# Patient Record
Sex: MALE | Race: WHITE | NOT HISPANIC OR LATINO | Employment: FULL TIME | URBAN - METROPOLITAN AREA
[De-identification: names, ages, dates, MRNs, and addresses within clinical notes are randomized per-mention and may not be internally consistent; named-entity substitution may affect disease eponyms.]

---

## 2017-11-18 ENCOUNTER — HOSPITAL ENCOUNTER (EMERGENCY)
Facility: HOSPITAL | Age: 47
Discharge: HOME | End: 2017-11-18
Attending: EMERGENCY MEDICINE
Payer: MEDICAID

## 2017-11-18 VITALS
TEMPERATURE: 98 F | HEART RATE: 82 BPM | OXYGEN SATURATION: 98 % | SYSTOLIC BLOOD PRESSURE: 138 MMHG | WEIGHT: 225 LBS | BODY MASS INDEX: 30.48 KG/M2 | DIASTOLIC BLOOD PRESSURE: 82 MMHG | HEIGHT: 72 IN | RESPIRATION RATE: 18 BRPM

## 2017-11-18 DIAGNOSIS — R11.2 NON-INTRACTABLE VOMITING WITH NAUSEA, UNSPECIFIED VOMITING TYPE: Primary | ICD-10-CM

## 2017-11-18 DIAGNOSIS — E86.0 DEHYDRATION: ICD-10-CM

## 2017-11-18 DIAGNOSIS — K29.00 ACUTE GASTRITIS WITHOUT HEMORRHAGE, UNSPECIFIED GASTRITIS TYPE: ICD-10-CM

## 2017-11-18 LAB
ALBUMIN SERPL BCP-MCNC: 4.1 G/DL
ALP SERPL-CCNC: 55 U/L
ALT SERPL W/O P-5'-P-CCNC: 35 U/L
ANION GAP SERPL CALC-SCNC: 14 MMOL/L
AST SERPL-CCNC: 25 U/L
BACTERIA #/AREA URNS HPF: NORMAL /HPF
BASOPHILS # BLD AUTO: 0.05 K/UL
BASOPHILS NFR BLD: 0.3 %
BILIRUB SERPL-MCNC: 0.6 MG/DL
BILIRUB UR QL STRIP: NEGATIVE
BUN SERPL-MCNC: 13 MG/DL
CALCIUM SERPL-MCNC: 9.4 MG/DL
CHLORIDE SERPL-SCNC: 102 MMOL/L
CLARITY UR: CLEAR
CO2 SERPL-SCNC: 24 MMOL/L
COLOR UR: YELLOW
CREAT SERPL-MCNC: 1.3 MG/DL
DIFFERENTIAL METHOD: ABNORMAL
EOSINOPHIL # BLD AUTO: 0.1 K/UL
EOSINOPHIL NFR BLD: 0.4 %
ERYTHROCYTE [DISTWIDTH] IN BLOOD BY AUTOMATED COUNT: 15.4 %
EST. GFR  (AFRICAN AMERICAN): >60 ML/MIN/1.73 M^2
EST. GFR  (NON AFRICAN AMERICAN): >60 ML/MIN/1.73 M^2
GLUCOSE SERPL-MCNC: 108 MG/DL
GLUCOSE UR QL STRIP: ABNORMAL
HCT VFR BLD AUTO: 50.5 %
HGB BLD-MCNC: 16.6 G/DL
HGB UR QL STRIP: NEGATIVE
HYALINE CASTS #/AREA URNS LPF: 0 /LPF
KETONES UR QL STRIP: ABNORMAL
LEUKOCYTE ESTERASE UR QL STRIP: NEGATIVE
LIPASE SERPL-CCNC: 54 U/L
LYMPHOCYTES # BLD AUTO: 1.6 K/UL
LYMPHOCYTES NFR BLD: 10.9 %
MCH RBC QN AUTO: 28.4 PG
MCHC RBC AUTO-ENTMCNC: 32.9 G/DL
MCV RBC AUTO: 86 FL
MICROSCOPIC COMMENT: NORMAL
MONOCYTES # BLD AUTO: 0.7 K/UL
MONOCYTES NFR BLD: 4.9 %
NEUTROPHILS # BLD AUTO: 12.5 K/UL
NEUTROPHILS NFR BLD: 83.5 %
NITRITE UR QL STRIP: NEGATIVE
PH UR STRIP: 5 [PH] (ref 5–8)
PLATELET # BLD AUTO: 204 K/UL
PMV BLD AUTO: 11.5 FL
POTASSIUM SERPL-SCNC: 4.3 MMOL/L
PROT SERPL-MCNC: 8 G/DL
PROT UR QL STRIP: ABNORMAL
RBC # BLD AUTO: 5.85 M/UL
RBC #/AREA URNS HPF: 2 /HPF (ref 0–4)
SODIUM SERPL-SCNC: 140 MMOL/L
SP GR UR STRIP: 1.02 (ref 1–1.03)
SQUAMOUS #/AREA URNS HPF: 4 /HPF
URN SPEC COLLECT METH UR: ABNORMAL
UROBILINOGEN UR STRIP-ACNC: ABNORMAL EU/DL
WBC # BLD AUTO: 14.95 K/UL
WBC #/AREA URNS HPF: 0 /HPF (ref 0–5)

## 2017-11-18 PROCEDURE — 85025 COMPLETE CBC W/AUTO DIFF WBC: CPT

## 2017-11-18 PROCEDURE — 80053 COMPREHEN METABOLIC PANEL: CPT

## 2017-11-18 PROCEDURE — 99283 EMERGENCY DEPT VISIT LOW MDM: CPT | Mod: 25

## 2017-11-18 PROCEDURE — 96360 HYDRATION IV INFUSION INIT: CPT

## 2017-11-18 PROCEDURE — 81000 URINALYSIS NONAUTO W/SCOPE: CPT

## 2017-11-18 PROCEDURE — 25000003 PHARM REV CODE 250: Performed by: PHYSICIAN ASSISTANT

## 2017-11-18 PROCEDURE — 83690 ASSAY OF LIPASE: CPT

## 2017-11-18 RX ORDER — ONDANSETRON 4 MG/1
4 TABLET, ORALLY DISINTEGRATING ORAL
Status: COMPLETED | OUTPATIENT
Start: 2017-11-18 | End: 2017-11-18

## 2017-11-18 RX ORDER — LISINOPRIL 40 MG/1
40 TABLET ORAL DAILY
COMMUNITY

## 2017-11-18 RX ORDER — AMLODIPINE BESYLATE AND ATORVASTATIN CALCIUM 5; 10 MG/1; MG/1
1 TABLET, FILM COATED ORAL DAILY
COMMUNITY

## 2017-11-18 RX ORDER — SUCRALFATE 1 G/1
1 TABLET ORAL 2 TIMES DAILY
Qty: 60 TABLET | Refills: 0 | Status: SHIPPED | OUTPATIENT
Start: 2017-11-18 | End: 2017-12-18

## 2017-11-18 RX ORDER — PANTOPRAZOLE SODIUM 20 MG/1
20 TABLET, DELAYED RELEASE ORAL DAILY
Qty: 30 TABLET | Refills: 0 | Status: SHIPPED | OUTPATIENT
Start: 2017-11-18 | End: 2018-11-18

## 2017-11-18 RX ADMIN — SODIUM CHLORIDE 1000 ML: 0.9 INJECTION, SOLUTION INTRAVENOUS at 02:11

## 2017-11-18 RX ADMIN — ONDANSETRON 4 MG: 4 TABLET, ORALLY DISINTEGRATING ORAL at 12:11

## 2017-11-18 RX ADMIN — LIDOCAINE HYDROCHLORIDE: 20 SOLUTION ORAL; TOPICAL at 12:11

## 2017-11-18 NOTE — ED TRIAGE NOTES
Pt arrived to ED c/o N/V, fever, hot flash, shaking for last 24 hours, 4th and 5th lumbar back pain. Pt reports took back pain meds last week.

## 2017-11-18 NOTE — ED PROVIDER NOTES
Encounter Date: 11/18/2017    SCRIBE #1 NOTE: Jamey CANDELARIO, rosa scribing for, and in the presence of,  Gautam Mitchell PA-C. I have scribed the following portions of the note - Other sections scribed: HPI, ROS.       History     Chief Complaint   Patient presents with    Back Pain     x4 days lower back, emesis x4 hours     CC: Vomiting    46 year old male with chronic back pain presents to the ED for evaluation of a 1 day history of multiple episodes of dark emesis with associated generalized abdominal pain. He also reports a 3-4 day history of decreased appetite leading up to the episodes of emesis today. Pt reports that he ran out of his pain medications 10 days ago. He has a pain management appointment in 3-4 weeks. He denies dysuria, hematuria, and other symptoms. Pt does not drink or smoke. He attempted Ibuprofen and Gabapentin for pain with no relief. No other symptoms reported. No alleviating or exacerbating factors reported.       The history is provided by the patient. No  was used.     Review of patient's allergies indicates:   Allergen Reactions    Penicillins Anaphylaxis     History reviewed. No pertinent past medical history.  Past Surgical History:   Procedure Laterality Date    HAND SURGERY       History reviewed. No pertinent family history.  Social History   Substance Use Topics    Smoking status: Never Smoker    Smokeless tobacco: Never Used    Alcohol use No     Review of Systems   Constitutional: Negative for appetite change (decreased) and fever.   HENT: Negative for sore throat.    Respiratory: Negative for shortness of breath.    Cardiovascular: Negative for chest pain.   Gastrointestinal: Positive for abdominal pain (generalized) and vomiting. Negative for nausea.   Genitourinary: Negative for dysuria and hematuria.   Musculoskeletal: Negative for back pain.   Skin: Negative for rash.   Neurological: Negative for weakness.   Hematological: Does not  bruise/bleed easily.       Physical Exam     Initial Vitals [11/18/17 1018]   BP Pulse Resp Temp SpO2   (!) 152/74 105 19 98.5 °F (36.9 °C) 97 %      MAP       100         Physical Exam    Nursing note and vitals reviewed.  Constitutional: He appears well-developed and well-nourished. He is not diaphoretic. No distress.   HENT:   Head: Normocephalic and atraumatic.   Eyes: Conjunctivae and EOM are normal. Pupils are equal, round, and reactive to light.   Neck: Normal range of motion. Neck supple.   Cardiovascular: Normal heart sounds and intact distal pulses.   Pulmonary/Chest: Breath sounds normal. No respiratory distress. He has no wheezes.   Abdominal:   Abdomen overall soft, normal bowel sounds ×4.  Mild TTP epigastric region.  No rebound, no guarding.  No flank or CVA tenderness.  No palpable mass.  No distention.   Musculoskeletal: Normal range of motion. He exhibits no tenderness.   Neurological: He is alert and oriented to person, place, and time. He has normal strength.   Skin: Skin is warm and dry. Capillary refill takes less than 2 seconds. No rash and no abscess noted. No erythema.   Psychiatric: He has a normal mood and affect. His behavior is normal. Judgment and thought content normal.         ED Course   Procedures  Labs Reviewed   CBC W/ AUTO DIFFERENTIAL - Abnormal; Notable for the following:        Result Value    WBC 14.95 (*)     RDW 15.4 (*)     Gran # 12.5 (*)     Gran% 83.5 (*)     Lymph% 10.9 (*)     All other components within normal limits   URINALYSIS - Abnormal; Notable for the following:     Protein, UA 1+ (*)     Glucose, UA 2+ (*)     Ketones, UA 2+ (*)     Urobilinogen, UA 2.0-3.0 (*)     All other components within normal limits   COMPREHENSIVE METABOLIC PANEL   LIPASE   URINALYSIS MICROSCOPIC             Medical Decision Making:   Initial Assessment:   46-year-old male chief complaint 4 day history of nausea, abdominal pain and intractable vomiting which began yesterday.  No fever.   No diarrhea.  Differential Diagnosis:   Gastroenteritis, gastritis, GERD  Clinical Tests:   Lab Tests: Ordered and Reviewed  ED Management:  Patient overall well-appearing, in no acute distress, afebrile, vitals within normal limits.    Patient presents to ED with chief complaint nausea and decreased appetite over the past 4 days, with intractable vomiting and abdominal pain which began yesterday.  He denies fever.  He denies history of gastritis or stomach issues.  Last bowel movement 2 days ago, was normal.  Patient denies urinary complaints.  Patient denies hematemesis, however does state he's been vomiting up dark liquid.  Denies alcohol abuse.  He is on a medication which has been used in the past for alcohol withdrawal.  Patient states he has good follow-up with his primary in [facility].  He denies fever or chills.  He denies chest pain shortness of breath.  He denies recent sick contacts or recent illness.  On exam, he is uncomfortable appearing but nontoxic.  He is tachycardic.  He does exhibit mild to palpation of midepigastric region.  Negative Ellison sign.  No history of abdominal surgeries.  Normal bowel sounds throughout.  I do not suspect obstruction at this time.  Differential includes gastritis, gastroenteritis, GERD, peptic ulcer disease, gastroparesis.    At this point, we'll get blood work, and GI cocktail.    On reevaluation, patient is much more comfortable appearing.  He admits to up to 65% relief of his symptoms.  Patient does take many by mouth medications secondary to chronic back pain.  This may represent gastritis.  Urinalysis with evidence of dehydration without infection or hematuria.  CBC without evidence of significant infection or anemia.  CMP without acute electrolyte abnormality.  I will rehydrate and discharge patient with medicines to protect his stomach.  I will have him follow with primary next week.  He does understand and agree.  Other:   I have discussed this case  with another health care provider.       <> Summary of the Discussion: I have discussed this case with Dr. Bobby.            Scribe Attestation:   Scribe #1: I performed the above scribed service and the documentation accurately describes the services I performed. I attest to the accuracy of the note.    Attending Attestation:     Physician Attestation Statement for NP/PA:   I discussed this assessment and plan of this patient with the NP/PA, but I did not personally examine the patient. The face to face encounter was performed by the NP/PA.        Physician Attestation for Scribe:  Physician Attestation Statement for Scribe #1: I, Gautam Mitchell PA-C, reviewed documentation, as scribed by Jamey Patel in my presence, and it is both accurate and complete.                 ED Course      Clinical Impression:   The primary encounter diagnosis was Non-intractable vomiting with nausea, unspecified vomiting type. Diagnoses of Acute gastritis without hemorrhage, unspecified gastritis type and Dehydration were also pertinent to this visit.    Disposition:   Disposition: Discharged  Condition: Stable                        Gautam Mitchell PA-C  11/18/17 1546       Cristobal Bobby MD  11/19/17 1044

## 2017-11-18 NOTE — DISCHARGE INSTRUCTIONS
Follow-up with primary care physician on Monday for reevaluation.  Take medications as prescribed.  Drink lots of fluids.  Return to this ED if any other problems occur.